# Patient Record
Sex: FEMALE | Race: BLACK OR AFRICAN AMERICAN | NOT HISPANIC OR LATINO | Employment: PART TIME | URBAN - METROPOLITAN AREA
[De-identification: names, ages, dates, MRNs, and addresses within clinical notes are randomized per-mention and may not be internally consistent; named-entity substitution may affect disease eponyms.]

---

## 2023-03-11 ENCOUNTER — HOSPITAL ENCOUNTER (EMERGENCY)
Facility: HOSPITAL | Age: 18
Discharge: LEFT BEFORE DISCHARGE | End: 2023-03-12
Payer: MEDICAID

## 2023-03-11 VITALS
DIASTOLIC BLOOD PRESSURE: 73 MMHG | BODY MASS INDEX: 32.52 KG/M2 | SYSTOLIC BLOOD PRESSURE: 127 MMHG | HEART RATE: 72 BPM | WEIGHT: 190.5 LBS | RESPIRATION RATE: 18 BRPM | OXYGEN SATURATION: 97 % | TEMPERATURE: 98 F | HEIGHT: 64 IN

## 2023-03-11 PROCEDURE — 99281 EMR DPT VST MAYX REQ PHY/QHP: CPT

## 2023-03-11 RX ORDER — TETRACAINE HYDROCHLORIDE 5 MG/ML
2 SOLUTION OPHTHALMIC
Status: DISCONTINUED | OUTPATIENT
Start: 2023-03-11 | End: 2023-03-12 | Stop reason: HOSPADM

## 2023-03-12 NOTE — FIRST PROVIDER EVALUATION
"Medical screening examination initiated.  I have conducted a focused provider triage encounter, findings are as follows:    Brief history of present illness:  Patient presents with pain to the right eye after a  hit her in the eye just prior to arrival.  Reports blurry vision.    Vitals:    03/11/23 2110   BP: 127/73   BP Location: Right arm   Patient Position: Sitting   Pulse: 72   Resp: 18   Temp: 98.3 °F (36.8 °C)   TempSrc: Oral   SpO2: 97%   Weight: 86.4 kg (190 lb 7.6 oz)   Height: 5' 4" (1.626 m)       Pertinent physical exam:      Brief workup plan:      Preliminary workup initiated; this workup will be continued and followed by the physician or advanced practice provider that is assigned to the patient when roomed.  "

## 2023-06-21 ENCOUNTER — HOSPITAL ENCOUNTER (EMERGENCY)
Facility: HOSPITAL | Age: 18
Discharge: HOME | End: 2023-06-21
Attending: STUDENT IN AN ORGANIZED HEALTH CARE EDUCATION/TRAINING PROGRAM
Payer: MEDICAID

## 2023-06-21 VITALS
BODY MASS INDEX: 31.65 KG/M2 | WEIGHT: 190 LBS | SYSTOLIC BLOOD PRESSURE: 119 MMHG | DIASTOLIC BLOOD PRESSURE: 70 MMHG | TEMPERATURE: 99 F | RESPIRATION RATE: 20 BRPM | HEART RATE: 91 BPM | OXYGEN SATURATION: 97 % | HEIGHT: 65 IN

## 2023-06-21 DIAGNOSIS — R07.89 CHEST WALL PAIN: Primary | ICD-10-CM

## 2023-06-21 DIAGNOSIS — R06.02 SHORTNESS OF BREATH: ICD-10-CM

## 2023-06-21 PROBLEM — L83 ACANTHOSIS NIGRICANS, ACQUIRED: Status: ACTIVE | Noted: 2017-05-30

## 2023-06-21 PROBLEM — R06.00 DYSPNEA: Status: ACTIVE | Noted: 2023-01-27

## 2023-06-21 PROBLEM — R07.9 CHEST PAIN: Status: ACTIVE | Noted: 2023-01-27

## 2023-06-21 LAB
B-HCG UR QL: NEGATIVE
CTP QC/QA: YES

## 2023-06-21 PROCEDURE — 99284 EMERGENCY DEPT VISIT MOD MDM: CPT | Mod: 25,ER

## 2023-06-21 PROCEDURE — 93010 ELECTROCARDIOGRAM REPORT: CPT | Mod: ,,, | Performed by: INTERNAL MEDICINE

## 2023-06-21 PROCEDURE — 99900035 HC TECH TIME PER 15 MIN (STAT): Mod: ER

## 2023-06-21 PROCEDURE — 93005 ELECTROCARDIOGRAM TRACING: CPT | Mod: ER

## 2023-06-21 PROCEDURE — 81025 URINE PREGNANCY TEST: CPT | Mod: ER | Performed by: PHYSICIAN ASSISTANT

## 2023-06-21 PROCEDURE — 93010 EKG 12-LEAD: ICD-10-PCS | Mod: ,,, | Performed by: INTERNAL MEDICINE

## 2023-06-21 PROCEDURE — 25000003 PHARM REV CODE 250: Mod: ER | Performed by: PHYSICIAN ASSISTANT

## 2023-06-21 RX ORDER — METHOCARBAMOL 750 MG/1
1500 TABLET, FILM COATED ORAL 3 TIMES DAILY
Qty: 30 TABLET | Refills: 0 | Status: SHIPPED | OUTPATIENT
Start: 2023-06-21 | End: 2023-06-26

## 2023-06-21 RX ORDER — IBUPROFEN 600 MG/1
600 TABLET ORAL EVERY 6 HOURS PRN
Qty: 30 TABLET | Refills: 0 | Status: SHIPPED | OUTPATIENT
Start: 2023-06-21

## 2023-06-21 RX ORDER — NAPROXEN 500 MG/1
500 TABLET ORAL 2 TIMES DAILY
COMMUNITY
Start: 2023-01-28

## 2023-06-21 RX ORDER — IBUPROFEN 600 MG/1
600 TABLET ORAL
Status: COMPLETED | OUTPATIENT
Start: 2023-06-21 | End: 2023-06-21

## 2023-06-21 RX ADMIN — IBUPROFEN 600 MG: 600 TABLET, FILM COATED ORAL at 10:06

## 2023-06-21 NOTE — ED PROVIDER NOTES
Encounter Date: 6/21/2023       History     Chief Complaint   Patient presents with    Shortness of Breath     Pt reports SOB last night while lying down with some chest discomfort. Denies any pain today. Denies any cough or congestion.       HPI: Esther Bangura, a 17 y.o. female  has no past medical history on file.     She presents to the ED evaluation of shortness of breath and chest discomfort with lying flat yesterday.  States that she had to sleep propped up.  States that pain is similar to previous episode in her late January which she was seen at [facility] and given a prescription of and unknown name.  Denies any sick history.  Treatments tried include administration of Tylenol.  She denies any use of OCPs, recent prolonged travel, recent cancer diagnosis, recent injury or surgery.        The history is provided by the patient.   Review of patient's allergies indicates:  No Known Allergies  History reviewed. No pertinent past medical history.  History reviewed. No pertinent surgical history.  History reviewed. No pertinent family history.  Social History     Tobacco Use    Smoking status: Never    Smokeless tobacco: Never   Substance Use Topics    Alcohol use: Never    Drug use: Never     Review of Systems   Constitutional:  Negative for fever.   Cardiovascular:  Positive for chest pain (Chest wall). Negative for palpitations and leg swelling.   Gastrointestinal:  Negative for nausea and vomiting.   Allergic/Immunologic: Negative for immunocompromised state.   Neurological:  Negative for weakness and numbness.   Hematological:  Negative for adenopathy.   Psychiatric/Behavioral:  Negative for agitation.    All other systems reviewed and are negative.    Physical Exam     Initial Vitals [06/21/23 0940]   BP Pulse Resp Temp SpO2   119/70 91 20 98.7 °F (37.1 °C) 97 %      MAP       --         Physical Exam    Nursing note and vitals reviewed.  Constitutional: She appears well-developed and  well-nourished. She is not diaphoretic. No distress.   HENT:   Head: Normocephalic and atraumatic.   Right Ear: External ear normal.   Left Ear: External ear normal.   Nose: Nose normal.   Eyes: Conjunctivae and EOM are normal.   Neck:   Normal range of motion.  Cardiovascular:  Normal rate and regular rhythm.           Pulmonary/Chest: No respiratory distress. She has no wheezes. She has no rhonchi. She has no rales. She exhibits tenderness.     Musculoskeletal:         General: Normal range of motion.      Cervical back: Normal range of motion.     Neurological: She is alert and oriented to person, place, and time.   Skin: No rash noted.   Psychiatric: She has a normal mood and affect. Thought content normal.       ED Course   Procedures  Labs Reviewed   POCT URINE PREGNANCY          Imaging Results              X-Ray Chest PA And Lateral (Final result)  Result time 06/21/23 11:06:38      Final result by Rojas Soni MD (06/21/23 11:06:38)                   Impression:      Clear chest without acute abnormality.      Electronically signed by: Rojas Soni  Date:    06/21/2023  Time:    11:06               Narrative:    EXAMINATION:  XR CHEST PA AND LATERAL    CLINICAL HISTORY:  Other chest pain    TECHNIQUE:  PA and lateral views of the chest were performed.    COMPARISON:  None    FINDINGS:  The lungs are clear, with normal appearance of pulmonary vasculature and no pleural effusion or pneumothorax.    The cardiac silhouette is normal in size. The hilar and mediastinal contours are unremarkable.    Bones are intact.                                       Medications   ibuprofen tablet 600 mg (has no administration in time range)     Medical Decision Making:   Initial Assessment:   Chest wall pain   Differential Diagnosis:   Costochondritis, muscular strain, ACS   ED Management:  Pt presents to ED for evaluation chest wall pain that is reproducible.  No concerning findings no EKG, CXR, likely secondary to  increased exercise activity. Encouraged administration of NSAIDs and f/u with PCP.  Instructed to return with new or concerning symptoms.             ED Course as of 06/21/23 1205   Wed Jun 21, 2023   1046 EKG 12-lead  Interpreted independently by me.  Normal sinus rhythm. No ST elevation or depression.  No T-wave inversions.  No evidence of ischemia. Rate of 76 [HL]      ED Course User Index  [HL] Morena Low DO                 Clinical Impression:   Final diagnoses:  [R06.02] Shortness of breath  [R07.89] Chest wall pain (Primary)               Elza Cline PA-C  06/21/23 1208

## 2024-09-26 ENCOUNTER — HOSPITAL ENCOUNTER (EMERGENCY)
Facility: HOSPITAL | Age: 19
Discharge: HOME | End: 2024-09-26
Attending: EMERGENCY MEDICINE
Payer: MEDICAID

## 2024-09-26 VITALS
SYSTOLIC BLOOD PRESSURE: 109 MMHG | OXYGEN SATURATION: 100 % | DIASTOLIC BLOOD PRESSURE: 76 MMHG | TEMPERATURE: 98 F | HEIGHT: 64 IN | RESPIRATION RATE: 17 BRPM | WEIGHT: 180 LBS | BODY MASS INDEX: 30.73 KG/M2 | HEART RATE: 100 BPM

## 2024-09-26 DIAGNOSIS — J39.2 THROAT IRRITATION: Primary | ICD-10-CM

## 2024-09-26 LAB
GROUP A STREP, MOLECULAR: NEGATIVE
INFLUENZA A, MOLECULAR: NEGATIVE
INFLUENZA B, MOLECULAR: NEGATIVE
SARS-COV-2 RDRP RESP QL NAA+PROBE: NEGATIVE
SPECIMEN SOURCE: NORMAL

## 2024-09-26 PROCEDURE — 87502 INFLUENZA DNA AMP PROBE: CPT | Mod: ER | Performed by: EMERGENCY MEDICINE

## 2024-09-26 PROCEDURE — 99282 EMERGENCY DEPT VISIT SF MDM: CPT | Mod: ER

## 2024-09-26 PROCEDURE — 87651 STREP A DNA AMP PROBE: CPT | Mod: ER | Performed by: EMERGENCY MEDICINE

## 2024-09-26 PROCEDURE — U0002 COVID-19 LAB TEST NON-CDC: HCPCS | Mod: ER | Performed by: EMERGENCY MEDICINE

## 2024-09-26 NOTE — ED PROVIDER NOTES
"Encounter Date: 9/26/2024       History     Chief Complaint   Patient presents with    Sore Throat     Pt reports "For the past 24 hours we have been gutting out a house and I think I got sick from the mold. My throat has been hurting." Did not wear a mask. Denies SOB or chest pain. Ibuprofen at 0700.      Presents for evaluation of sore throat, fatigue, body aches after working on gutting a house. She was not wearing respiratory protection. + cough. No hypoxia or distress.     The history is provided by medical records and the patient.     Review of patient's allergies indicates:  No Known Allergies  History reviewed. No pertinent past medical history.  History reviewed. No pertinent surgical history.  No family history on file.  Social History     Tobacco Use    Smoking status: Never    Smokeless tobacco: Never   Substance Use Topics    Alcohol use: Never    Drug use: Yes     Types: Marijuana     Comment: last use 9-25-24     Review of Systems   Constitutional:  Positive for activity change and fatigue. Negative for chills and fever.   HENT:  Positive for congestion and sore throat.    Respiratory:  Positive for cough. Negative for shortness of breath.    Gastrointestinal:  Positive for vomiting. Negative for abdominal pain.   Musculoskeletal:  Positive for myalgias.   Neurological:  Negative for weakness and numbness.   All other systems reviewed and are negative.      Physical Exam     Initial Vitals [09/26/24 1025]   BP Pulse Resp Temp SpO2   109/76 100 17 97.7 °F (36.5 °C) 100 %      MAP       --         Physical Exam    Nursing note and vitals reviewed.  Constitutional: She appears well-developed and well-nourished. She is not diaphoretic. No distress.   HENT:   Head: Normocephalic and atraumatic.   Mouth/Throat: No dental abscesses. Posterior oropharyngeal erythema present. No oropharyngeal exudate, posterior oropharyngeal edema or tonsillar abscesses.   Eyes: Conjunctivae and EOM are normal.   Neck: Neck " supple.   Normal range of motion.  Cardiovascular:  Normal rate and regular rhythm.           Pulmonary/Chest: Breath sounds normal. No respiratory distress. She has no wheezes. She has no rhonchi. She has no rales.   Abdominal: Abdomen is soft. There is no abdominal tenderness.   Musculoskeletal:         General: No tenderness. Normal range of motion.      Cervical back: Normal range of motion and neck supple.     Neurological: She is alert. GCS score is 15. GCS eye subscore is 4. GCS verbal subscore is 5. GCS motor subscore is 6.   Skin: Capillary refill takes less than 2 seconds.   Psychiatric: She has a normal mood and affect. Her behavior is normal.         ED Course   Procedures  Labs Reviewed   INFLUENZA A & B BY MOLECULAR       Result Value    Influenza A, Molecular Negative      Influenza B, Molecular Negative      Flu A & B Source Nasal swab     GROUP A STREP, MOLECULAR    Group A Strep, Molecular Negative     SARS-COV-2 RNA AMPLIFICATION, QUAL    SARS-CoV-2 RNA, Amplification, Qual Negative            Imaging Results    None          Medications - No data to display  Medical Decision Making  Strep, flu, covid neg  No evidence of tonsillar abscess, or posterior edema  ? Symptoms 2/2 respiratory irritants  Lungs CTAB  Advised respiratory protection if continuing this work. Removal from exposure at this time.   Advised on symptomatic care.     Amount and/or Complexity of Data Reviewed  Labs: ordered. Decision-making details documented in ED Course.      Additional MDM:   Differential Diagnosis:   Strep, bacterial uri, flu, covid, inhalation., other                                    Clinical Impression:  Final diagnoses:  [J39.2] Throat irritation (Primary)          ED Disposition Condition    Discharge Stable          ED Prescriptions    None       Follow-up Information    None          Po Templeton MD  09/26/24 1128

## 2024-09-26 NOTE — Clinical Note
"Esther Fofana" Willem was seen and treated in our emergency department on 9/26/2024.  She may return to work on 09/27/2024.       If you have any questions or concerns, please don't hesitate to call.      JULIETTE Vila RN    "

## 2024-09-26 NOTE — Clinical Note
"Esther Fofana" Willem was seen and treated in our emergency department on 9/26/2024.  She may return to work on 09/28/2024.       If you have any questions or concerns, please don't hesitate to call.       RN    "

## 2024-09-26 NOTE — DISCHARGE INSTRUCTIONS
Drink plenty of cool/cold water  Can use cough drops or throat sprays to help with symptoms.       Thank you for letting me care for you today! It was nice meeting you, and I hope you feel better soon. Please come back to [facility] for all of your future medical needs.     Our goal in the emergency department is to always give you outstanding care and exceptional service. You may receive a survey by mail or e-mail in the next week regarding your experience in our ED. We would greatly appreciate you completing and returning the survey. Your feedback provides us with a way to recognize our staff who give very good care and it helps us learn how to improve when your experience was below our aspiration of excellence.      Sincerely,    Po Templeton MD

## 2025-03-16 ENCOUNTER — HOSPITAL ENCOUNTER (EMERGENCY)
Facility: HOSPITAL | Age: 20
Discharge: HOME | End: 2025-03-16
Attending: EMERGENCY MEDICINE
Payer: MEDICAID

## 2025-03-16 VITALS
HEART RATE: 87 BPM | OXYGEN SATURATION: 100 % | RESPIRATION RATE: 19 BRPM | BODY MASS INDEX: 33.12 KG/M2 | SYSTOLIC BLOOD PRESSURE: 120 MMHG | WEIGHT: 194 LBS | DIASTOLIC BLOOD PRESSURE: 81 MMHG | HEIGHT: 64 IN | TEMPERATURE: 99 F

## 2025-03-16 DIAGNOSIS — S96.911A STRAIN OF RIGHT ANKLE, INITIAL ENCOUNTER: Primary | ICD-10-CM

## 2025-03-16 LAB
B-HCG UR QL: NEGATIVE
CTP QC/QA: YES

## 2025-03-16 PROCEDURE — 99284 EMERGENCY DEPT VISIT MOD MDM: CPT | Mod: 25,ER

## 2025-03-16 PROCEDURE — 96372 THER/PROPH/DIAG INJ SC/IM: CPT | Performed by: EMERGENCY MEDICINE

## 2025-03-16 PROCEDURE — 81025 URINE PREGNANCY TEST: CPT | Mod: ER | Performed by: EMERGENCY MEDICINE

## 2025-03-16 PROCEDURE — 63600175 PHARM REV CODE 636 W HCPCS: Mod: JZ,TB,ER | Performed by: EMERGENCY MEDICINE

## 2025-03-16 RX ORDER — DICLOFENAC SODIUM 50 MG/1
50 TABLET, DELAYED RELEASE ORAL 2 TIMES DAILY PRN
Qty: 14 TABLET | Refills: 0 | Status: SHIPPED | OUTPATIENT
Start: 2025-03-16 | End: 2025-03-23

## 2025-03-16 RX ORDER — KETOROLAC TROMETHAMINE 30 MG/ML
15 INJECTION, SOLUTION INTRAMUSCULAR; INTRAVENOUS
Status: COMPLETED | OUTPATIENT
Start: 2025-03-16 | End: 2025-03-16

## 2025-03-16 RX ADMIN — KETOROLAC TROMETHAMINE 15 MG: 30 INJECTION, SOLUTION INTRAMUSCULAR; INTRAVENOUS at 08:03

## 2025-03-16 NOTE — ED NOTES
Pt states that she woke up 3 days ago with pain to right ankle. Pt states that she doesn't believe that she had an injury, pain is a 9/10 at rest and is unable to bear weight on foot. States pain starts in right ankle and travels into calf

## 2025-03-16 NOTE — ED NOTES
Pt sitting in bed calm, states pain is still at a 9 no relieve with IM injection at this time. Pt in no acute distress at this time and no needs at this time

## 2025-03-16 NOTE — Clinical Note
"Esther Fofana" Willem was seen and treated in our emergency department on 3/16/2025.  She may return to work on 03/17/2025.       If you have any questions or concerns, please don't hesitate to call.      Yasmin Khan NRP     "

## 2025-03-16 NOTE — ED PROVIDER NOTES
Emergency Department Encounter  Provider Note    Esther Bangura  [id number]  3/16/2025    Evaluation:    History Acquisition:     Chief Complaint   Patient presents with    Ankle Pain     Pt C/O R ankle pain X 3 days. Pt denies injury. No obvious abnormalities noted.        History of Present Illness:  Esther Bangura who is a 19 y.o. female who presents to the ED today for right ankle pain for 3 days.  Patient denies any injury or trauma.  Pain is in the right medial aspect of the ankle.  No swelling.  No calf swelling.  Patient is a  at [employer], stands regularly for long amounts of time.    Prior medical records were reviewed:   Patient was seen 07/10/2023 by her OBGYN for her annual exam    The patient's list of active medical history, family/social history, medications, and allergies as documented has been reviewed.     History reviewed. No pertinent past medical history.  History reviewed. No pertinent surgical history.  No family history on file.  Social History     Socioeconomic History    Marital status: Single   Tobacco Use    Smoking status: Never    Smokeless tobacco: Never   Substance and Sexual Activity    Alcohol use: Never    Drug use: Yes     Types: Marijuana     Comment: last use 9-25-24       Medications:  Medication List with Changes/Refills   New Medications    DICLOFENAC (VOLTAREN) 50 MG EC TABLET    Take 1 tablet (50 mg total) by mouth 2 (two) times daily as needed (pain).   Current Medications    IBUPROFEN (ADVIL,MOTRIN) 600 MG TABLET    Take 1 tablet (600 mg total) by mouth every 6 (six) hours as needed for Pain.    NAPROXEN (NAPROSYN) 500 MG TABLET    Take 500 mg by mouth 2 (two) times daily.       Allergies:  Review of patient's allergies indicates:  No Known Allergies      Physical Exam:     ED Triage Vitals [03/16/25 0830]   /81   Pulse 87   Resp 19   Temp 98.5 °F (36.9 °C)   SpO2 100 %     Physical Exam    Physical Exam  Vitals and nursing note reviewed.    Constitutional:       General: No acute distress.     Appearance: Normal appearance. Well-developed.   Cardiovascular:      Rate and Rhythm: Normal rate.   Pulmonary:      Effort:  Pulmonary effort is normal. No respiratory distress.   Musculoskeletal:         Right lower extremity:  Ankle - There is no edema.  No erythema.  She has tenderness to palpation along the medial malleolus.  There is no edema noted to the calf.  The muscle compartment is normal.  No tenderness to palpation along the muscle compartment no cording, no bony tenderness in the calf.  Knee is normal.  Foot is normal.     Skin:     General: Skin is warm and dry.   Neurological:      Mental Status: Alert and oriented.     Cranial Nerves: No cranial nerve deficit.       Differential Diagnoses:   Based on available information and initial assessment, differential diagnosis includes but is not limited to strain, sprain, fracture, dislocation    ED Management:     Orders Placed This Encounter    X-Ray Ankle Complete Right    POCT urine pregnancy    ketorolac injection 15 mg    diclofenac (VOLTAREN) 50 MG EC tablet          Labs:   Independently interpreted the labs ordered by myself see ED course  Labs Reviewed   POCT URINE PREGNANCY       Result Value    POC Preg Test, Ur Negative       Acceptable Yes            Imaging:   Independently interpreted the imaging ordered by myself see ED course  Imaging Results              X-Ray Ankle Complete Right (Final result)  Result time 03/16/25 09:29:31      Final result by Rojas Potts III, MD (03/16/25 09:29:31)                   Impression:         Negative right ankle x-ray.    Finalized on: 3/16/2025 9:29 AM By:  Rojas Potts MD  [facility]# [id number]      2025-03-16 09:31:31.547     [facility]               Narrative:    EXAM:XR ANKLE COMPLETE 3 VIEW RIGHT      CLINICAL HISTORY: Right ankle pain.    COMPARISON: None      FINDINGS:    Bones:  No fracture/dislocation or osseous  lesion.    Joint spaces:  Well-preserved.    Soft tissues: Normal.                                               Medications Given:     Medications   ketorolac injection 15 mg (15 mg Intramuscular Given 3/16/25 0852)        Medical Decision Making:    Additional Consideration:         Social determinants of health considered during development of treatment plan include:     Body mass index is 33.3 kg/m². - Cumulative social disadvantage, denoted by higher SDOH burden, was associated with increased odds of obesity, independent of clinical and demographic factors. PMID: [id number] DOI: 10.1002/vale.35344       ED Course as of 03/16/25 0947   Sun Mar 16, 2025   0945 I independently interpreted the x-ray: No sign of fracture or dislocation. [JA]   0945 UPT negative, lab was independently interpreted [JA]      ED Course User Index  [JA] Teressa Ann MD            Medical Decision Making  Problems Addressed:  Strain of right ankle, initial encounter: complicated acute illness or injury     Details: Patient presents to the emergency department today with pain to the right ankle.  X-ray today shows no fracture or dislocation.  She has no edema to suggest DVT.  Likely patient has a strain.  She was placed in an Ace wrap and will place on anti-inflammatories.  Follow up with PCP in 1 week if not improving.    Amount and/or Complexity of Data Reviewed  Labs: ordered. Decision-making details documented in ED Course.  Radiology: ordered and independent interpretation performed. Decision-making details documented in ED Course.    Risk  OTC drugs.  Prescription drug management.  Diagnosis or treatment significantly limited by social determinants of health.          Clinical Impression:       ICD-10-CM ICD-9-CM   1. Strain of right ankle, initial encounter  S96.911A 845.00       Discharge Medications:  New Prescriptions    DICLOFENAC (VOLTAREN) 50 MG EC TABLET    Take 1 tablet (50 mg total) by mouth 2 (two) times daily as  needed (pain).         Follow-up Information       Follow up With Specialties Details Why Contact Info    Your PCP  Schedule an appointment as soon as possible for a visit in 1 week As needed              ED Disposition Condition    Discharge Stable               Teressa Ann MD  03/16/25 2220

## 2025-03-16 NOTE — DISCHARGE INSTRUCTIONS
Your xray today shows no broken bones, it is normal.  Take your medications as prescribed. Follow up with your PCP in 1 week if not improving.